# Patient Record
Sex: MALE | Race: WHITE
[De-identification: names, ages, dates, MRNs, and addresses within clinical notes are randomized per-mention and may not be internally consistent; named-entity substitution may affect disease eponyms.]

---

## 2018-10-07 ENCOUNTER — TRANSCRIPTION ENCOUNTER (OUTPATIENT)
Age: 58
End: 2018-10-07

## 2019-10-26 ENCOUNTER — TRANSCRIPTION ENCOUNTER (OUTPATIENT)
Age: 59
End: 2019-10-26

## 2020-05-04 ENCOUNTER — TRANSCRIPTION ENCOUNTER (OUTPATIENT)
Age: 60
End: 2020-05-04

## 2020-05-04 PROBLEM — Z00.00 ENCOUNTER FOR PREVENTIVE HEALTH EXAMINATION: Status: ACTIVE | Noted: 2020-05-04

## 2020-05-05 ENCOUNTER — APPOINTMENT (OUTPATIENT)
Dept: PHYSICAL MEDICINE AND REHAB | Facility: CLINIC | Age: 60
End: 2020-05-05
Payer: COMMERCIAL

## 2020-05-05 VITALS
DIASTOLIC BLOOD PRESSURE: 85 MMHG | SYSTOLIC BLOOD PRESSURE: 149 MMHG | TEMPERATURE: 97.7 F | HEART RATE: 91 BPM | WEIGHT: 185 LBS | BODY MASS INDEX: 27.4 KG/M2 | HEIGHT: 69 IN

## 2020-05-05 DIAGNOSIS — M47.816 SPONDYLOSIS W/OUT MYELOPATHY OR RADICULOPATHY, LUMBAR REGION: ICD-10-CM

## 2020-05-05 PROCEDURE — 99204 OFFICE O/P NEW MOD 45 MIN: CPT

## 2020-05-05 RX ORDER — BUPROPION HYDROCHLORIDE 150 MG/1
150 TABLET, FILM COATED, EXTENDED RELEASE ORAL
Qty: 49 | Refills: 0 | Status: ACTIVE | COMMUNITY
Start: 2020-04-23

## 2020-05-05 RX ORDER — ATORVASTATIN CALCIUM 80 MG/1
80 TABLET, FILM COATED ORAL
Qty: 90 | Refills: 0 | Status: ACTIVE | COMMUNITY
Start: 2020-04-01

## 2020-05-05 RX ORDER — ATORVASTATIN CALCIUM 40 MG/1
40 TABLET, FILM COATED ORAL
Qty: 90 | Refills: 0 | Status: ACTIVE | COMMUNITY
Start: 2019-12-23

## 2020-05-05 RX ORDER — LISINOPRIL 20 MG/1
20 TABLET ORAL
Qty: 90 | Refills: 0 | Status: ACTIVE | COMMUNITY
Start: 2020-03-16

## 2020-05-05 RX ORDER — CYCLOBENZAPRINE HYDROCHLORIDE 10 MG/1
10 TABLET, FILM COATED ORAL
Qty: 15 | Refills: 0 | Status: ACTIVE | COMMUNITY
Start: 2020-05-04

## 2020-05-05 NOTE — PHYSICAL EXAM
[FreeTextEntry1] : Gen: NAD\par HEENT: neck supple\par CV: no cyanosis\par Pulm: breathing well on room air\par Abd: soft\par Low back: range of motion limited by pain, tenderness to palpation right lower lumbar paraspinals, neg straight leg raise, neg FABERE, neg FAIR, neg homen right\par Right hip: ROM limited in all planes worse in abduction and extension, tenderness over right GT and ITB, neg shawn\par Msk: \par 5/5 hip flexion B/L, 5/5 knee extension B/L, 5/5 knee flexion B/L, 5/5 dorsiflexion B/L, 5/5 plantar flexion B/L\par 5/5 shoulder abduction B/L, 5/5 elbow flexion B/L, 5/5 elbow extension B/L, 5/5 wrist extension B/L, 5/5 hand  B/L\par Neuro: sensation intact to light touch in bilateral upper and lower extremities, reflexes 2+ brachioradialis, biceps, triceps bilaterally, reflexes 2+ patella, medial hamstring, achilles bilaterally, negative babinski, negative gamino\par

## 2020-05-05 NOTE — ASSESSMENT
[FreeTextEntry1] : 60 yo M who presents with low back pain and right lateral hip pain consistent with lumbar radiculopathy, lumbar spondylosis, right ITB and right greater trochanteric bursitis.  Pain is improving with NSAID and muscle relaxant.\par \par -Continue Naproxen 500 mg PO BID and cyclobenzaprine 10 mg PO TID as started in acute care center.  Patient denies side effects from this medication.  Patient history of arrhythmias but currently being treated with pacemaker.  Pacemaker is not MRI compatible.\par -Start PT and HEP\par -RTC 2-3 weeks, if pain persists or worsen despite compliance with above, then will consider US guided right GT bursa injection vs. CT lumbar spine w/o contrast at next visit.\par \par Segundo Grimaldo MD\par Spine and Sports Medicine\par \par [employer]\par At [facility]\par \par

## 2020-05-05 NOTE — HISTORY OF PRESENT ILLNESS
[FreeTextEntry1] : 60 yo M with PMH cardiac arrhythmia s/p pacemaker placement, HTN who presents with low back and right hip pain.\par \par Onset: 4 weeks.  No inciting events, trauma, or falls.\par Location: right lower lumbar spine and right lateral hip\par Characteristics: sharp\par Aggravating factors: prolonged sitting, standing, walking, lifting heavy objects\par Alleviating factors: rest\par Radiation: right lateral hip\par Treatments: patient presented to urgent care center and was prescribed Naproxen 500 mg PO BID and cyclobenzaprine 10 mg PO TID with significant improvement in his pain. No previous physical therapy, HEP\par Severity: 5-6/10\par \par Diagnostic studies:\par No recent MRI\par No previous EMG/NCS\par \par Patient denies new weakness, numbness or paresthesia.  Patient denies bowel/bladder dysfunction, fevers, chills, weight loss, night pain, or night sweats.\par

## 2020-05-06 ENCOUNTER — TRANSCRIPTION ENCOUNTER (OUTPATIENT)
Age: 60
End: 2020-05-06

## 2020-05-12 ENCOUNTER — APPOINTMENT (OUTPATIENT)
Dept: RHEUMATOLOGY | Facility: CLINIC | Age: 60
End: 2020-05-12
Payer: COMMERCIAL

## 2020-05-12 DIAGNOSIS — R23.0 CYANOSIS: ICD-10-CM

## 2020-05-12 DIAGNOSIS — M54.16 RADICULOPATHY, LUMBAR REGION: ICD-10-CM

## 2020-05-12 DIAGNOSIS — M25.551 PAIN IN RIGHT HIP: ICD-10-CM

## 2020-05-12 DIAGNOSIS — Z82.61 FAMILY HISTORY OF ARTHRITIS: ICD-10-CM

## 2020-05-12 DIAGNOSIS — F17.200 NICOTINE DEPENDENCE, UNSPECIFIED, UNCOMPLICATED: ICD-10-CM

## 2020-05-12 PROCEDURE — 99201 OFFICE OUTPATIENT NEW 10 MINUTES: CPT | Mod: 95

## 2020-05-12 RX ORDER — NAPROXEN 500 MG/1
500 TABLET ORAL
Qty: 60 | Refills: 0 | Status: ACTIVE | COMMUNITY
Start: 2020-05-04

## 2020-05-12 RX ORDER — CYCLOBENZAPRINE HYDROCHLORIDE 10 MG/1
10 TABLET, FILM COATED ORAL 3 TIMES DAILY
Qty: 60 | Refills: 1 | Status: ACTIVE | COMMUNITY
Start: 2020-05-05 | End: 1900-01-01

## 2020-05-18 ENCOUNTER — APPOINTMENT (OUTPATIENT)
Dept: VASCULAR SURGERY | Facility: CLINIC | Age: 60
End: 2020-05-18
Payer: COMMERCIAL

## 2020-05-18 VITALS — HEART RATE: 103 BPM | DIASTOLIC BLOOD PRESSURE: 86 MMHG | SYSTOLIC BLOOD PRESSURE: 137 MMHG

## 2020-05-18 VITALS
TEMPERATURE: 97.8 F | HEIGHT: 69 IN | BODY MASS INDEX: 27.4 KG/M2 | SYSTOLIC BLOOD PRESSURE: 142 MMHG | HEART RATE: 105 BPM | WEIGHT: 185 LBS | DIASTOLIC BLOOD PRESSURE: 85 MMHG

## 2020-05-18 PROCEDURE — 99203 OFFICE O/P NEW LOW 30 MIN: CPT

## 2020-05-18 PROCEDURE — 93923 UPR/LXTR ART STDY 3+ LVLS: CPT

## 2020-05-19 PROBLEM — F17.200 CURRENT EVERY DAY SMOKER: Status: ACTIVE | Noted: 2020-05-19

## 2020-05-19 PROBLEM — Z82.61 FAMILY HISTORY OF ARTHRITIS: Status: ACTIVE | Noted: 2020-05-19

## 2020-05-19 NOTE — ASSESSMENT
[FreeTextEntry1] : 60M active smoker (min 20 pack years) and  with low back pain and sudden onset right leg/calf pain and intermittent foot cyanosis concerning for intermittent claudication.\par Given that pt is likely vasculopath from smoking history, peripheral artery disease is higher up on the differential, including Leriche syndrome. Asymmetric presentation, no association with cold and age of onset make Raynaud's syndrome less likely. 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 May benefit from arterial Doppler, CTA with run-off\par -Discussed smoking cessation > 3 min, including therapy, medications and helpline. Pt deferred help, but is strongly considering it.\par -If PAD is ruled out, will pursue further workup for Raynauds

## 2020-05-19 NOTE — PHYSICAL EXAM
[General Appearance - Alert] : alert [General Appearance - In No Acute Distress] : in no acute distress [Musculoskeletal - Swelling] : no joint swelling seen [Oriented To Time, Place, And Person] : oriented to person, place, and time [FreeTextEntry1] : no visual differences in the color of feet. Both appear well perfused at the present time.

## 2020-05-19 NOTE — HISTORY OF PRESENT ILLNESS
[Home] : at home, [unfilled] , at the time of the visit. [Other Location: e.g. Home (Enter Location, City,State)___] : at [unfilled] [Patient] : the patient [Self] : self [FreeTextEntry1] : [place] used.  \par Patient referred from ED for evaluation of Raynaud’s syndrome.\par \par PHI:\par -  60M with PPM, min 20 pack year smoking history,  with 2 weeks of intense LBP, right-sided calf cramps at 100ft ambulation for the past week with 2-3 episodes of reversible right cold numb blue foot, also with numbness/tingling/burning of right lateral thigh.\par   -No h/o injury to right leg in the past.   \par -He went to [facility] urgent care who did venous Doppler ([facility], no report available) which was reportedly normal, so urgent care sent him to rheumatologist for Raynaud's. \par -Unclear if arterial Dopplers done.  \par -For back pain pt rx naproxen and flexeril have helped with back pain.\par -Pt has had 2-3 episodes of cold blue foot lying bed in as well as ambulation in the past week that have been self-limited.\par

## 2020-05-22 ENCOUNTER — APPOINTMENT (OUTPATIENT)
Dept: CT IMAGING | Facility: CLINIC | Age: 60
End: 2020-05-22
Payer: COMMERCIAL

## 2020-05-22 ENCOUNTER — OUTPATIENT (OUTPATIENT)
Dept: OUTPATIENT SERVICES | Facility: HOSPITAL | Age: 60
LOS: 1 days | End: 2020-05-22
Payer: COMMERCIAL

## 2020-05-22 ENCOUNTER — RESULT REVIEW (OUTPATIENT)
Age: 60
End: 2020-05-22

## 2020-05-22 DIAGNOSIS — Z00.8 ENCOUNTER FOR OTHER GENERAL EXAMINATION: ICD-10-CM

## 2020-05-22 PROCEDURE — 75635 CT ANGIO ABDOMINAL ARTERIES: CPT

## 2020-05-22 PROCEDURE — 75635 CT ANGIO ABDOMINAL ARTERIES: CPT | Mod: 26

## 2020-05-26 DIAGNOSIS — I70.219 ATHEROSCLEROSIS OF NATIVE ARTERIES OF EXTREMITIES WITH INTERMITTENT CLAUDICATION, UNSPECIFIED EXTREMITY: ICD-10-CM

## 2020-05-27 ENCOUNTER — APPOINTMENT (OUTPATIENT)
Dept: PHYSICAL MEDICINE AND REHAB | Facility: CLINIC | Age: 60
End: 2020-05-27

## 2020-05-28 PROBLEM — I70.219 ATHEROSCLEROSIS OF LOWER EXTREMITY WITH CLAUDICATION: Status: ACTIVE | Noted: 2020-05-18

## 2020-05-28 RX ORDER — CILOSTAZOL 100 MG/1
100 TABLET ORAL TWICE DAILY
Qty: 60 | Refills: 5 | Status: COMPLETED | COMMUNITY
Start: 2020-05-28 | End: 2020-11-24